# Patient Record
Sex: FEMALE | Race: WHITE | NOT HISPANIC OR LATINO | Employment: FULL TIME | URBAN - NONMETROPOLITAN AREA
[De-identification: names, ages, dates, MRNs, and addresses within clinical notes are randomized per-mention and may not be internally consistent; named-entity substitution may affect disease eponyms.]

---

## 2023-10-10 PROBLEM — J44.9 COPD (CHRONIC OBSTRUCTIVE PULMONARY DISEASE) (MULTI): Status: ACTIVE | Noted: 2023-10-10

## 2023-10-10 PROBLEM — I73.9 PAD (PERIPHERAL ARTERY DISEASE) (CMS-HCC): Status: ACTIVE | Noted: 2023-10-10

## 2023-10-10 PROBLEM — M54.16 CHRONIC LUMBAR RADICULOPATHY: Status: ACTIVE | Noted: 2023-10-10

## 2023-10-10 PROBLEM — M48.061 LUMBAR CANAL STENOSIS: Status: ACTIVE | Noted: 2023-10-10

## 2023-10-10 PROBLEM — M54.17 LUMBOSACRAL NEURITIS: Status: ACTIVE | Noted: 2023-10-10

## 2023-10-10 PROBLEM — R94.31 ABNORMAL ECG: Status: ACTIVE | Noted: 2023-10-10

## 2023-10-10 PROBLEM — F17.200 SMOKING: Status: ACTIVE | Noted: 2023-10-10

## 2023-10-10 RX ORDER — IPRATROPIUM BROMIDE AND ALBUTEROL SULFATE 2.5; .5 MG/3ML; MG/3ML
3 SOLUTION RESPIRATORY (INHALATION) 4 TIMES DAILY
COMMUNITY
Start: 2019-12-19

## 2023-10-10 RX ORDER — IBUPROFEN 200 MG
TABLET ORAL
COMMUNITY

## 2023-10-10 RX ORDER — IBUPROFEN 200 MG
1 TABLET ORAL EVERY 24 HOURS
COMMUNITY
Start: 2019-12-19

## 2023-10-10 RX ORDER — ALBUTEROL SULFATE 90 UG/1
2 AEROSOL, METERED RESPIRATORY (INHALATION) EVERY 6 HOURS
COMMUNITY

## 2023-10-10 RX ORDER — NAPROXEN SODIUM 220 MG/1
1 TABLET, FILM COATED ORAL DAILY
COMMUNITY
Start: 2019-12-19

## 2023-10-10 RX ORDER — OXYCODONE AND ACETAMINOPHEN 10; 325 MG/1; MG/1
1 TABLET ORAL 3 TIMES DAILY
COMMUNITY
Start: 2015-05-21

## 2023-10-10 RX ORDER — LISINOPRIL 2.5 MG/1
1 TABLET ORAL DAILY
COMMUNITY
Start: 2019-12-19

## 2023-10-10 RX ORDER — HYDROCHLOROTHIAZIDE 25 MG/1
TABLET ORAL
COMMUNITY
Start: 2015-04-17

## 2023-10-10 RX ORDER — DEXTROMETHORPHAN HBR. AND GUAIFENESIN 10; 100 MG/5ML; MG/5ML
5 SOLUTION ORAL EVERY 4 HOURS PRN
COMMUNITY
Start: 2019-12-19

## 2023-10-10 RX ORDER — GABAPENTIN 400 MG/1
1 CAPSULE ORAL 3 TIMES DAILY
COMMUNITY
Start: 2015-02-23

## 2023-10-10 RX ORDER — BENZONATATE 100 MG/1
100 CAPSULE ORAL 3 TIMES DAILY
COMMUNITY

## 2023-10-11 ENCOUNTER — EVALUATION (OUTPATIENT)
Dept: OCCUPATIONAL THERAPY | Facility: HOSPITAL | Age: 63
End: 2023-10-11
Payer: COMMERCIAL

## 2023-10-11 ENCOUNTER — EVALUATION (OUTPATIENT)
Dept: PHYSICAL THERAPY | Facility: HOSPITAL | Age: 63
End: 2023-10-11
Payer: COMMERCIAL

## 2023-10-11 DIAGNOSIS — S52.602D UNSPECIFIED FRACTURE OF LOWER END OF LEFT ULNA, SUBSEQUENT ENCOUNTER FOR CLOSED FRACTURE WITH ROUTINE HEALING: ICD-10-CM

## 2023-10-11 DIAGNOSIS — S52.502D UNSPECIFIED FRACTURE OF THE LOWER END OF LEFT RADIUS, SUBSEQUENT ENCOUNTER FOR CLOSED FRACTURE WITH ROUTINE HEALING: ICD-10-CM

## 2023-10-11 DIAGNOSIS — M25.632 WRIST STIFFNESS, LEFT: ICD-10-CM

## 2023-10-11 DIAGNOSIS — S52.502D CLOSED FRACTURE OF DISTAL END OF LEFT RADIUS WITH ROUTINE HEALING, UNSPECIFIED FRACTURE MORPHOLOGY, SUBSEQUENT ENCOUNTER: Primary | ICD-10-CM

## 2023-10-11 DIAGNOSIS — S82.122A CLOSED FRACTURE OF LATERAL PORTION OF LEFT TIBIAL PLATEAU: ICD-10-CM

## 2023-10-11 DIAGNOSIS — S82.122D CLOSED FRACTURE OF LATERAL PORTION OF LEFT TIBIAL PLATEAU WITH ROUTINE HEALING: Primary | ICD-10-CM

## 2023-10-11 DIAGNOSIS — S52.602D CLOSED FRACTURE OF DISTAL END OF LEFT ULNA WITH ROUTINE HEALING, UNSPECIFIED FRACTURE MORPHOLOGY, SUBSEQUENT ENCOUNTER: ICD-10-CM

## 2023-10-11 PROCEDURE — 97165 OT EVAL LOW COMPLEX 30 MIN: CPT | Mod: GO | Performed by: OCCUPATIONAL THERAPIST

## 2023-10-11 PROCEDURE — 97162 PT EVAL MOD COMPLEX 30 MIN: CPT | Mod: GP

## 2023-10-11 ASSESSMENT — ENCOUNTER SYMPTOMS
DEPRESSION: 0
DEPRESSION: 0
LOSS OF SENSATION IN FEET: 1
OCCASIONAL FEELINGS OF UNSTEADINESS: 1
LOSS OF SENSATION IN FEET: 0
OCCASIONAL FEELINGS OF UNSTEADINESS: 1

## 2023-10-11 ASSESSMENT — PAIN - FUNCTIONAL ASSESSMENT
PAIN_FUNCTIONAL_ASSESSMENT: 0-10
PAIN_FUNCTIONAL_ASSESSMENT: 0-10

## 2023-10-11 ASSESSMENT — PAIN SCALES - GENERAL
PAINLEVEL_OUTOF10: 2
PAINLEVEL_OUTOF10: 0 - NO PAIN

## 2023-10-11 ASSESSMENT — ACTIVITIES OF DAILY LIVING (ADL): BATHING_ASSISTANCE: MODIFIED INDEPENDENT (DEVICE)

## 2023-10-11 NOTE — LETTER
October 11, 2023     Patient: Chely Martinez   YOB: 1960   Date of Visit: 10/11/2023       To Whom It May Concern:    It is my medical opinion that Chely Martinez {Work release (duty restriction):78663}.    If you have any questions or concerns, please don't hesitate to call.         Sincerely,        Gume Elizondo, OT    CC: No Recipients

## 2023-10-11 NOTE — PROGRESS NOTES
Physical Therapy    Physical Therapy Evaluation    Patient Name: Chely Martinez  MRN: [id number]  Today's Date: 10/11/2023  Time Calculation  Start Time: 0900  Stop Time: 0940  Time Calculation (min): 40 min    Assessment  PT Assessment Results: Decreased strength, Decreased range of motion, Impaired balance, Decreased endurance  Rehab Prognosis: Good  Evaluation/Treatment Tolerance: Patient tolerated treatment well    The patient was evaluated by PT and presents with significant functional mobility deficits. Recommendation is for skilled PT to address deficits and work to return patient to [facility].    Plan  Treatment/Interventions: Education/ Instruction, Gait training, Therapeutic activities, Therapeutic exercises  PT Frequency: 2 times per week  Duration: 4  Number of Treatments Authorized: 20  Rehab Potential: Good  Plan of Care Agreement: Patient        Subjective   General:  General  Reason for Referral: Patient referred for ambulatory difficulty.  Referred By: Marcela Jean CNP  Past Medical History Relevant to Rehab: Patient fractured L tibial plateau. Acute then rehab stays completed, the patient was discharged home with HHPT. Patient no longer homebound and was referred for OPPT.  Precautions:  Precautions  STEADI Fall Risk Score (The score of 4 or more indicates an increased risk of falling): 7  LE Weight Bearing Status: Weight Bearing as Tolerated  Pain:  Pain Assessment: 0-10  Pain Score: 0 - No pain  Prior Function Per Pt/Caregiver Report:  Ambulatory Assistance: Independent  Vocational: Other (Comment) (fmla)    Objective   Range of Motion:  Range of Motion Comments: 0-125 L kinee flexion  Strength:  Strength Comments: 4+/5    Outcome Measures:  Timed Up and Go Test  How many seconds did it take to complete the 5 tasks?: 14.2 seconds    Other Measures  5x Sit to Stand: 17.5 sec  Lower Extremity Funtional Score (LEFS): 48     Goals:  Encounter Problems       Encounter Problems (Active)       PT Problem        The patient will ambulate 10 minutes continuously  to allow return to community ambulation tasks.  (Progressing)       Start:  10/11/23    Expected End:  11/11/23            The patient will improve performance in 5 time sit to stand test to < 15 to demonstrate increased LE strength to improve functional gait and transfers.  (Progressing)       Start:  10/11/23    Expected End:  11/11/23            The patient will decrease their time in the timed up and go to <12 sec to demonstrate improved balance and functional ambulatory ability.  (Progressing)       Start:  10/11/23    Expected End:  11/11/23            Increase L knee flexion to 135 degrees with 5/5 thigh MMT to restore normal mechanics. (Progressing)       Start:  10/11/23    Expected End:  11/11/23            The patient will demonstrate full understanding and competent performance of home program to maintain or further improve their condition.  (Progressing)       Start:  10/11/23    Expected End:  11/11/23

## 2023-10-11 NOTE — LETTER
October 11, 2023     Patient: Chely Martinez   YOB: 1960   Date of Visit: 10/11/2023       To Whom it May Concern:    Chely Martinez was seen in my clinic on 10/11/2023. She {Return to school/sport:69284}.    If you have any questions or concerns, please don't hesitate to call.         Sincerely,          Gume Elizondo, OT        CC: No Recipients

## 2023-10-11 NOTE — LETTER
October 11, 2023     Patient: Chely Martinez   YOB: 1960   Date of Visit: 10/11/2023       To Whom It May Concern:    It is my medical opinion that Chely Martinez {Work release (duty restriction):50245}.    If you have any questions or concerns, please don't hesitate to call.         Sincerely,        Andry Munson, PT    CC: No Recipients

## 2023-10-11 NOTE — PROGRESS NOTES
Occupational Therapy    Occupational Therapy Evaluation    Name: Chely Martinez  MRN: [id number]  : 1960  Date: 10/11/23  Time Calculation  Start Time: 1010  Stop Time: 1048  Time Calculation (min): 38 min    Assessment:  OT Assessment  OT Assessment Results: Decreased upper extremity range of motion, Decreased upper extremity strength, Decreased sensation, Decreased fine motor control  Strengths: Ability to acquire knowledge, Attitude of self, Capable of completing ADLs semi/independent, Coping skills, Rehab experience  Plan:  Outpatient Plan  Treatment Interventions: ADL retraining, UE strengthening/ROM, Endurance training, Patient/family training, Neuromuscular reeducation, Fine motor coordination activities  Frequency: 2x  Duration: 4 weeks  Number of Treatments Authorized: 20  Rehab Potential: Good  Plan of Care Agreement: Patient    Subjective   Current Problem:  1. Closed fracture of distal end of left radius with routine healing, unspecified fracture morphology, subsequent encounter  Follow Up In Occupational Therapy      2. Unspecified fracture of the lower end of left radius, subsequent encounter for closed fracture with routine healing  Referral to Occupational Therapy      3. Unspecified fracture of lower end of left ulna, subsequent encounter for closed fracture with routine healing  Referral to Occupational Therapy      4. Closed fracture of distal end of left ulna with routine healing, unspecified fracture morphology, subsequent encounter  Follow Up In Occupational Therapy      5. Wrist stiffness, left  Follow Up In Occupational Therapy        General:   OT Received On: 10/11/23  General  Reason for Referral: L UE ROM and strengthening dt fractures  Referred By: Marcela Jean  Past Medical History Relevant to Rehab: Pt fell down steps and fractured L wrist and L knee  Family/Caregiver Present: No  General Comment: Had home health when discharged from the hospital, now referred to  outpatient  Precautions:  Precautions  STEADI Fall Risk Score (The score of 4 or more indicates an increased risk of falling): 7  UE Weight Bearing Status: Weight Bearing as Tolerated  Braces Applied: history of bracing     Pain Assessment:  Pain Assessment  Pain Assessment: 0-10  Pain Score: 2  Pain Type: Chronic pain    Objective   Cognition:  Cognition  Overall Cognitive Status: Within Functional Limits  Cognition Test Scores:     Home Living:  Home Living  Type of Home: House  Lives With: Alone  Home Layout: One level  Home Access: Stairs to enter with rails  Entrance Stairs-Number of Steps: 3  Bathroom Shower/Tub: Tub/shower unit  Prior Function Per Pt/Caregiver Report:  Prior Function Per Pt/Caregiver Report  Level of Oliver: Independent with ADLs and functional transfers, Independent with homemaking with ambulation, Independent with homemaking with wheelchair  Receives Help From: Family  Vocational: Full time employment (works at [employer], currently [employer])  Hand Dominance: Right  IADL History:  Current License: Yes  Mode of Transportation: Car  ADL:  Eating Assistance: Modified independent (Device)  Bathing Assistance: Modified independent (Device)  UE Dressing Assistance: Modified independent (Device)  LE Dressing Assistance: Modified independent (Device)  Toileting Assistance with Device: Modified independent     Vision:Vision - Basic Assessment  Current Vision: Wears glasses only for reading  Sensation:  Light Touch: Partial deficits in the LUE  Sensation Comment: appears to be limited along median nerve distribution in L hand     Wrist Functional Rating Scale  Quick Dash: 27.27    Wrist AROM WFL unless documented below  R wrist flexion: (70°): 71  L wrist flexion: (70°): 50  R wrist extension: (70°): 58  L wrist extension: (70°): 38  R wrist radial deviation: (20°): 22  L wrist radial deviation: (20°): 27  R ulnar deviation: (30°): 35  L ulnar deviation: (30°): 24      Strength WFL unless  documented below  R  strength: 46  L  strength: 23    Left Hand AROM:  L Thumb MCP 0-60 (degrees): 40 Degrees  L Thumb IP 0-80 (degrees): 50 Degrees  L Thumb Radial ADduction/ABduction 0-55 (degrees): 40  L Thumb Palmar ADduction/ABduction 0-45 (degrees): 40  L Thumb Opposition to Index: oppose to middle phalanx of 5th digit  L Index  MCP 0-90 (degrees): 70 Degrees  L Index PIP 0-100 (degrees): 94 Degrees  L Index DIP 0-70 (degrees): 43 Degrees  L Long  MCP 0-90 (degrees): 86 Degrees  L Long PIP 0-100 (degrees): 100 Degrees  L Long DIP 0-70 (degrees): 46 Degrees  L Ring  MCP 0-90 (degrees): 90 Degrees  L Ring PIP 0-100 (degrees): 111 Degrees  L Ring DIP 0-70 (degrees): 40 Degrees  L Little  MCP 0-90 (degrees): 96 Degrees  L Little PIP 0-100 (degrees): 89 Degrees  L Little DIP 0-70 (degrees): 66 Degrees    Goals:  Active       OT Goals       Pt. will improve WRIST ROM BY 30 DEGREES in order to perform self-care, household, work and/or leisure tasks.          Start:  10/11/23    Expected End:  11/08/23            Pt. will demonstrate I with stating and demonstrating home exercise program in order to improve patient's ability to perform self-care, household, and/or leisure tasks.        Start:  10/11/23    Expected End:  11/08/23            Pt. will demo increased L hand fine motor ability as evidenced by performance on 9 hole peg test by 10 seconds       Start:  10/11/23    Expected End:  11/08/23            Pt. will increase hand strength by 15# to increase participation in self-care, household, and work/leisure tasks       Start:  10/11/23    Expected End:  11/08/23

## 2023-10-11 NOTE — LETTER
October 11, 2023    FAIZA Cain  [address]  [facility], [address] [address]    Patient: Chely Martinez   YOB: 1960   Date of Visit: 10/11/2023       Dear Faiza Cain  [address] [place] [address]    The attached plan of care is being sent to you because your patient’s medical reimbursement requires that you certify the plan of care. Your signature is required to allow uninterrupted insurance coverage.      You may indicate your approval by signing below and faxing this form back to us at Dept Fax: [phone].    Please call Dept: [phone] with any questions or concerns.    Thank you for this referral,        Andry Munson, PT  [facility]  [address] [facility] [address] [phone]    Payer: Payor: [employer] [place] / Plan: [employer] / Product Type: *No Product type* /                                                                         Date:     Dear Andry Munson, PT,     Re: Ms. Chely Martinez, MRN:[id number]    I certify that I have reviewed the attached plan of care and it is medically necessary for Ms. Chely Martinez (1960) who is under my care.          ______________________________________                    _________________  Provider name and credentials                                           Date and time                                                                                           Plan of Care 10/11/23   Effective from: 10/11/2023  Effective to: 11/11/2023    Plan ID: [id number]            Participants as of Finalize on 10/11/2023    Name Type Comments Contact Info    FAIZA Cain Referring Provider  [phone]    Andry Munson, PT Physical Therapist  [phone]       Last Plan Note     No notes of the expected types and statuses found.        Current Participants as of 10/11/2023    Name Type  Comments Contact Info    Marcela Jean, LAUREN-CNP Referring Provider  [phone]    Signature pending    Andry Munson, RENATE Physical Therapist  [phone]    Signature pending

## 2023-10-11 NOTE — LETTER
October 11, 2023    Andry Munson, PT  [address]  Rehab Services  [facility] [address]    Patient: Chely Martinez   YOB: 1960   Date of Visit: 10/11/2023       Dear Faiza Cain  [address] [place] [address]    The attached plan of care is being sent to you because your patient’s medical reimbursement requires that you certify the plan of care. Your signature is required to allow uninterrupted insurance coverage.      You may indicate your approval by signing below and faxing this form back to us at Dept Fax: [phone].    Please call Dept: [phone] with any questions or concerns.    Thank you for this referral,        Andry Munson PT  [facility]  [address]  [facility] [address]  [phone]    Payer: Payor: [employer] [place] / Plan: [employer] / Product Type: *No Product type* /                                                                         Date:     Dear Andry Munson PT,     Re: Ms. Chely Martinez, MRN:[id number]    I certify that I have reviewed the attached plan of care and it is medically necessary for Ms. Chely Martinez (1960) who is under my care.          ______________________________________                    _________________  Provider name and credentials                                           Date and time                                                                                           Plan of Care 10/11/23   Effective from: 10/11/2023  Effective to: 11/11/2023    Plan ID: [id number]            Participants as of Finalize on 10/11/2023    Name Type Comments Contact Info    FAIZA Cain Referring Provider  [phone]    Andry Munson PT Physical Therapist  [phone]       Last Plan Note     No notes of the expected types and statuses found.        Current Participants as of 10/11/2023    Name Type Comments Contact Info    Marcela Sanchez  LAUREN Jean-CNP Referring Provider  [phone]    Signature pending    Andry Munson PT Physical Therapist  [phone]    Signature pending

## 2023-10-11 NOTE — LETTER
October 11, 2023     Patient: Chely Martinez   YOB: 1960   Date of Visit: 10/11/2023       To Whom it May Concern:    Chely Martinez was seen in my clinic on 10/11/2023. She {Return to school/sport:04324}.    If you have any questions or concerns, please don't hesitate to call.         Sincerely,          Andry Munson, PT        CC: No Recipients

## 2023-10-16 ENCOUNTER — TREATMENT (OUTPATIENT)
Dept: PHYSICAL THERAPY | Facility: HOSPITAL | Age: 63
End: 2023-10-16
Payer: COMMERCIAL

## 2023-10-16 ENCOUNTER — TREATMENT (OUTPATIENT)
Dept: OCCUPATIONAL THERAPY | Facility: HOSPITAL | Age: 63
End: 2023-10-16
Payer: COMMERCIAL

## 2023-10-16 DIAGNOSIS — S82.122D CLOSED FRACTURE OF LATERAL PORTION OF LEFT TIBIAL PLATEAU WITH ROUTINE HEALING: ICD-10-CM

## 2023-10-16 DIAGNOSIS — M25.632 WRIST STIFFNESS, LEFT: ICD-10-CM

## 2023-10-16 DIAGNOSIS — S52.502D CLOSED FRACTURE OF DISTAL END OF LEFT RADIUS WITH ROUTINE HEALING, UNSPECIFIED FRACTURE MORPHOLOGY, SUBSEQUENT ENCOUNTER: ICD-10-CM

## 2023-10-16 DIAGNOSIS — S52.602D CLOSED FRACTURE OF DISTAL END OF LEFT ULNA WITH ROUTINE HEALING, UNSPECIFIED FRACTURE MORPHOLOGY, SUBSEQUENT ENCOUNTER: ICD-10-CM

## 2023-10-16 PROCEDURE — 97110 THERAPEUTIC EXERCISES: CPT | Mod: GO | Performed by: OCCUPATIONAL THERAPIST

## 2023-10-16 PROCEDURE — 97022 WHIRLPOOL THERAPY: CPT | Mod: GO | Performed by: OCCUPATIONAL THERAPIST

## 2023-10-16 PROCEDURE — 97110 THERAPEUTIC EXERCISES: CPT | Mod: GP

## 2023-10-16 ASSESSMENT — PAIN SCALES - GENERAL: PAINLEVEL_OUTOF10: 0 - NO PAIN

## 2023-10-16 ASSESSMENT — PAIN - FUNCTIONAL ASSESSMENT: PAIN_FUNCTIONAL_ASSESSMENT: 0-10

## 2023-10-16 NOTE — PROGRESS NOTES
Physical Therapy    Physical Therapy Treatment    Patient Name: Chely Martinez  MRN: [id number]  Today's Date: 10/16/2023  Time Calculation  Start Time: 1140  Stop Time: 1220  Time Calculation (min): 40 min      Assessment: Good tolerance of initial there ex program with just some minor fatigue post session.       Plan: Continue, build volume and intensity as tolerated.       Current Problem  1. Closed fracture of lateral portion of left tibial plateau with routine healing  Follow Up In Physical Therapy          Subjective   Doing well with no pain issues today.    General  Reason for Referral: Rehab s/p tibial plateau fracture  Pain  Pain Assessment: 0-10  Pain Score: 0 - No pain    Objective   Treatments:  Therapeutic Exercise  Therapeutic Exercise Performed: Yes  Therapeutic Exercise Activity 1: Pro2 x 8 @15W  Therapeutic Exercise Activity 2: Isokinetic knee flexion/extension 5x18b30 cm/sec  Therapeutic Exercise Activity 3: calf raise 3x10  Therapeutic Exercise Activity 4: abductor [id number]  Therapeutic Exercise Activity 5: EOB squat 3x10x BW  Therapeutic Exercise Activity 6: TM 5 min @ 1-1.5MPH      Goals:  Active       PT Problem       The patient will ambulate 10 minutes continuously  to allow return to community ambulation tasks.  (Progressing)       Start:  10/11/23    Expected End:  11/11/23            The patient will improve performance in 5 time sit to stand test to < 15 to demonstrate increased LE strength to improve functional gait and transfers.  (Progressing)       Start:  10/11/23    Expected End:  11/11/23            The patient will decrease their time in the timed up and go to <12 sec to demonstrate improved balance and functional ambulatory ability.  (Progressing)       Start:  10/11/23    Expected End:  11/11/23            Increase L knee flexion to 135 degrees with 5/5 thigh MMT to restore normal mechanics. (Progressing)       Start:  10/11/23    Expected End:  11/11/23            The patient  will demonstrate full understanding and competent performance of home program to maintain or further improve their condition.  (Progressing)       Start:  10/11/23    Expected End:  11/11/23

## 2023-10-16 NOTE — PROGRESS NOTES
Occupational Therapy    Occupational Therapy Treatment    Name: Chely Martinez  MRN: [id number]  : 1960  Date: 10/16/23  Time Calculation  Start Time: 1045  Stop Time: 1136  Time Calculation (min): 51 min  Visit:     Assessment: Upgraded HEP this date, no issues at this time     Plan: Continue with POC       Subjective   Pain Assessment: 2/10 wrist     Objective    Modalities:  Fluidotherapy L hand x 20 min at 115 degrees performing AROM     Updated current HEP from , reviewed with patient and performed each exercise with cues for tech. Printed exercises for patient at home.     OP EDUCATION:   Access Code: [email or web address]  URL: [email or web address]  Date: 10/16/2023  Prepared by: Gume Elizondo    Exercises  - Wrist Circumduction AROM  - 2 x daily - 7 x weekly - 2 sets - 10 reps  - Wrist Prayer Stretch at Table  - 2 x daily - 7 x weekly - 1 sets - 10 reps  - Seated Wrist Flexion PROM Stretch  - 2 x daily - 7 x weekly - 1 sets - 10 reps  - Wrist Flexion with Dumbbell  - 2 x daily - 7 x weekly - 1 sets - 10 reps 3# weight  - Wrist Extension with Dumbbell  - 2 x daily - 7 x weekly - 1 sets - 10 reps 3# weight  - Seated Wrist Radial Deviation with Dumbbell  - 2 x daily - 7 x weekly - 1 sets - 10 reps 3# weight  - Seated supination and pronation with hammer -  2 x daily - 7 x weekly - 1 sets - 10 reps with 3 sec hold  - Putty Squeezes  - 2 x daily - 7 x weekly - 1 sets - 10 reps red putty  - Finger Extension with Putty  - 2 x daily - 7 x weekly - 1 sets - 10 reps red putty  - Finger Adduction with Putty  - 2 x daily - 7 x weekly - 1 sets - 10 reps red putty  - Seated Finger Tip Pinch with Putty  - 2 x daily - 7 x weekly - 1 sets - 10 reps red putty    Goals:  Active       OT Goals       Pt. will improve WRIST ROM BY 30 DEGREES in order to perform self-care, household, work and/or leisure tasks.    (Progressing)       Start:  10/11/23    Expected End:  23            Pt. will  demonstrate I with stating and demonstrating home exercise program in order to improve patient's ability to perform self-care, household, and/or leisure tasks.  (Progressing)       Start:  10/11/23    Expected End:  11/08/23            Pt. will demo increased L hand fine motor ability as evidenced by performance on 9 hole peg test by 10 seconds (Progressing)       Start:  10/11/23    Expected End:  11/08/23            Pt. will increase hand strength by 15# to increase participation in self-care, household, and work/leisure tasks (Progressing)       Start:  10/11/23    Expected End:  11/08/23

## 2023-10-19 ENCOUNTER — TREATMENT (OUTPATIENT)
Dept: PHYSICAL THERAPY | Facility: HOSPITAL | Age: 63
End: 2023-10-19
Payer: COMMERCIAL

## 2023-10-19 ENCOUNTER — TREATMENT (OUTPATIENT)
Dept: OCCUPATIONAL THERAPY | Facility: HOSPITAL | Age: 63
End: 2023-10-19
Payer: COMMERCIAL

## 2023-10-19 DIAGNOSIS — M25.632 WRIST STIFFNESS, LEFT: ICD-10-CM

## 2023-10-19 DIAGNOSIS — S52.502D CLOSED FRACTURE OF DISTAL END OF LEFT RADIUS WITH ROUTINE HEALING, UNSPECIFIED FRACTURE MORPHOLOGY, SUBSEQUENT ENCOUNTER: ICD-10-CM

## 2023-10-19 DIAGNOSIS — S52.602D CLOSED FRACTURE OF DISTAL END OF LEFT ULNA WITH ROUTINE HEALING, UNSPECIFIED FRACTURE MORPHOLOGY, SUBSEQUENT ENCOUNTER: ICD-10-CM

## 2023-10-19 DIAGNOSIS — S82.122D CLOSED FRACTURE OF LATERAL PORTION OF LEFT TIBIAL PLATEAU WITH ROUTINE HEALING: ICD-10-CM

## 2023-10-19 PROCEDURE — 97110 THERAPEUTIC EXERCISES: CPT | Mod: GO

## 2023-10-19 PROCEDURE — 97010 HOT OR COLD PACKS THERAPY: CPT | Mod: GO

## 2023-10-19 PROCEDURE — 97110 THERAPEUTIC EXERCISES: CPT | Mod: GP,CQ

## 2023-10-19 ASSESSMENT — PAIN SCALES - GENERAL
PAINLEVEL_OUTOF10: 0 - NO PAIN
PAINLEVEL_OUTOF10: 0 - NO PAIN

## 2023-10-19 ASSESSMENT — PAIN - FUNCTIONAL ASSESSMENT
PAIN_FUNCTIONAL_ASSESSMENT: 0-10
PAIN_FUNCTIONAL_ASSESSMENT: 0-10

## 2023-10-19 NOTE — PROGRESS NOTES
Physical Therapy    Physical Therapy Treatment    Patient Name: Chely Martinez  MRN: [id number]  Today's Date: 10/19/2023  Time Calculation  Start Time: 1026  Stop Time: 1107  Time Calculation (min): 41 min      Assessment:   Patient tolerated all therapeutic interventions well. Making progress towards goals .     Plan:   Continue per POC and as patient tolerated     Current Problem  1. Closed fracture of lateral portion of left tibial plateau with routine healing  Follow Up In Physical Therapy          Subjective   General  General Comment: Patient states she does not really get much pain, just some achiness. Still struggling with ability to walk with  better quality    Pain  Pain Assessment: 0-10  Pain Score: 0 - No pain    Treatments:  Therapeutic Exercise  Therapeutic Exercise Performed: Yes- pro cycle x10 min, isokinetic knee flexion/extension 3x10 @90cm/sec, calf raises 3x10, hip abd machine 3x10 25#, EOB squat 3x10, santos alt hip ext 3x10, TM 5 min @1-1.5 MPH    Goals:  Active       PT Problem       The patient will ambulate 10 minutes continuously  to allow return to community ambulation tasks.  (Progressing)       Start:  10/11/23    Expected End:  11/11/23            The patient will improve performance in 5 time sit to stand test to < 15 to demonstrate increased LE strength to improve functional gait and transfers.  (Progressing)       Start:  10/11/23    Expected End:  11/11/23            The patient will decrease their time in the timed up and go to <12 sec to demonstrate improved balance and functional ambulatory ability.  (Progressing)       Start:  10/11/23    Expected End:  11/11/23            Increase L knee flexion to 135 degrees with 5/5 thigh MMT to restore normal mechanics. (Progressing)       Start:  10/11/23    Expected End:  11/11/23            The patient will demonstrate full understanding and competent performance of home program to maintain or further improve their condition.   (Progressing)       Start:  10/11/23    Expected End:  11/11/23

## 2023-10-19 NOTE — PROGRESS NOTES
Occupational Therapy    Occupational Therapy Treatment    Name: Chely Martinez  MRN: [id number]  : 1960  Date: 10/19/23  Time Calculation  Start Time: 0940  Stop Time: 1025  Time Calculation (min): 45 min    Assessment: increase ROM and strength; pt displayed some difficulty with session d/t decreased strength and coordination.      Plan: Continue with POC, monitor HEP       Subjective   General:  OT Last Visit  OT Received On: 10/19/23  General  General Comment: HEP going well. No pain over the past few days, stiffness.    Pain Assessment:  Pain Assessment  Pain Assessment: 0-10  Pain Score: 0 - No pain    Objective      Modalities:  Modalities Used: Yes  Modality 1: Untimed Hotpack (L wrist 10 minutes)     Therapy/Activity:   Therapeutic Exercise  Therapeutic Exercise Performed: Yes  Therapeutic Exercise Activity 1: PROM with overstretch and holds in wrist flexion/extension, radial/ulnar deviation, supination/pronation. Wrist flexion/extension, radial/ulnar deviation, supination/pronation with 2# weight. Gripper 5# 2 sets of 10 red clothspin tip pinch 2 sets of 10 green clothspin three jaw sherri 2 sets of 10.    OP EDUCATION:  Education  Individual(s) Educated: Patient  Education Provided: Anatomy & Physiology, Edema control  Patient/Caregiver Demonstrated Understanding: yes  Plan of Care Discussed and Agreed Upon: yes  Patient Response to Education: Patient/Caregiver Asked Appropriate Questions    Goals:  Active       OT Goals       Pt. will improve WRIST ROM BY 30 DEGREES in order to perform self-care, household, work and/or leisure tasks.    (Progressing)       Start:  10/11/23    Expected End:  23            Pt. will demonstrate I with stating and demonstrating home exercise program in order to improve patient's ability to perform self-care, household, and/or leisure tasks.  (Progressing)       Start:  10/11/23    Expected End:  23            Pt. will demo increased L hand fine motor ability  as evidenced by performance on 9 hole peg test by 10 seconds (Progressing)       Start:  10/11/23    Expected End:  11/08/23            Pt. will increase hand strength by 15# to increase participation in self-care, household, and work/leisure tasks (Progressing)       Start:  10/11/23    Expected End:  11/08/23

## 2023-10-25 ENCOUNTER — TREATMENT (OUTPATIENT)
Dept: OCCUPATIONAL THERAPY | Facility: HOSPITAL | Age: 63
End: 2023-10-25
Payer: COMMERCIAL

## 2023-10-25 ENCOUNTER — TREATMENT (OUTPATIENT)
Dept: PHYSICAL THERAPY | Facility: HOSPITAL | Age: 63
End: 2023-10-25
Payer: COMMERCIAL

## 2023-10-25 DIAGNOSIS — S82.122D CLOSED FRACTURE OF LATERAL PORTION OF LEFT TIBIAL PLATEAU WITH ROUTINE HEALING: ICD-10-CM

## 2023-10-25 DIAGNOSIS — S52.502D CLOSED FRACTURE OF DISTAL END OF LEFT RADIUS WITH ROUTINE HEALING, UNSPECIFIED FRACTURE MORPHOLOGY, SUBSEQUENT ENCOUNTER: ICD-10-CM

## 2023-10-25 DIAGNOSIS — M25.632 WRIST STIFFNESS, LEFT: ICD-10-CM

## 2023-10-25 DIAGNOSIS — S52.602D CLOSED FRACTURE OF DISTAL END OF LEFT ULNA WITH ROUTINE HEALING, UNSPECIFIED FRACTURE MORPHOLOGY, SUBSEQUENT ENCOUNTER: ICD-10-CM

## 2023-10-25 PROCEDURE — 97110 THERAPEUTIC EXERCISES: CPT | Mod: GP

## 2023-10-25 PROCEDURE — 97022 WHIRLPOOL THERAPY: CPT | Mod: GO

## 2023-10-25 PROCEDURE — 97110 THERAPEUTIC EXERCISES: CPT | Mod: GO

## 2023-10-25 ASSESSMENT — PAIN SCALES - GENERAL
PAINLEVEL_OUTOF10: 0 - NO PAIN
PAINLEVEL_OUTOF10: 0 - NO PAIN

## 2023-10-25 ASSESSMENT — PAIN - FUNCTIONAL ASSESSMENT
PAIN_FUNCTIONAL_ASSESSMENT: 0-10
PAIN_FUNCTIONAL_ASSESSMENT: 0-10

## 2023-10-25 NOTE — PROGRESS NOTES
Occupational Therapy    Occupational Therapy Treatment    Name: Chely Martinez  MRN: [id number]  : 1960  Date: 10/25/23  Time Calculation  Start Time: 1345  Stop Time: 1430  Time Calculation (min): 45 min    Assessment: increased wrist ROM/strength     Plan: Continue with POC       Subjective   General:  OT Last Visit  OT Received On: 10/25/23  General  General Comment: HEP going well. Pt reports holding down L arm during exercises with R to maintain correct posture.    Pain Assessment:  Pain Assessment  Pain Assessment: 0-10  Pain Score: 0 - No pain    Objective      Modalities:  Modalities Used: Yes  Modality 1: Untimed Fluidotherapy (L wrist 15 minutes with AROM throughout)    Therapy/Activity:   Therapeutic Exercise  Therapeutic Exercise Performed: Yes  Therapeutic Exercise Activity 1: PROM with overstretch and holds in wrist flexion/extension and supination/pronation. Wrist flexion/extension, radial/ulnar deviation, supination/pronation with 3# weight. Gripper 5# 2 sets of 10 red clothspin tip pinch; 2 sets of 10 green clothspin three jaw sherri.    Manual Therapy  Manual Therapy Performed: Yes  Manual Therapy Activity 1: cicumfrential/length measurements L leg and ankle taken for compression sock ordering this date. Pt has reported increased edema in L ankle since fracture.                          L ankle: 24.7 cm                     L mid-calf 29.5 cm                   Length (popliteal crease to bottom of foot): 40 cm    OP EDUCATION:  Education  Individual(s) Educated: Patient  Education Provided: Edema control, Anatomy & Physiology  Equipment: Tubigrip (B for L wrist)    Goals:  Active       OT Goals       Pt. will improve WRIST ROM BY 30 DEGREES in order to perform self-care, household, work and/or leisure tasks.    (Progressing)       Start:  10/11/23    Expected End:  23            Pt. will demonstrate I with stating and demonstrating home exercise program in order to improve patient's  ability to perform self-care, household, and/or leisure tasks.  (Progressing)       Start:  10/11/23    Expected End:  11/08/23            Pt. will demo increased L hand fine motor ability as evidenced by performance on 9 hole peg test by 10 seconds (Progressing)       Start:  10/11/23    Expected End:  11/08/23            Pt. will increase hand strength by 15# to increase participation in self-care, household, and work/leisure tasks (Progressing)       Start:  10/11/23    Expected End:  11/08/23

## 2023-10-25 NOTE — PROGRESS NOTES
Physical Therapy    Physical Therapy Treatment    Patient Name: Chely Martinez  MRN: [id number]  Today's Date: 10/25/2023  Time Calculation  Start Time: 1430  Stop Time: 1509  Time Calculation (min): 39 min      Assessment:  PT Assessment  Rehab Prognosis: Good    Plan:  OP PT Plan  Treatment/Interventions: Education/ Instruction, Therapeutic activities, Therapeutic exercises, Gait training  Rehab Potential: Good    Current Problem  1. Closed fracture of lateral portion of left tibial plateau with routine healing  Follow Up In Physical Therapy          Subjective   General  Patient doing well. No pain and more energy during home activities.    Reason for Referral: Rehab s/p tibial plateau fracture  Referred By: Marcela Jean CNP  Pain  Pain Assessment: 0-10  Pain Score: 0 - No pain    Objective   Treatments:  Therapeutic Exercise  Therapeutic Exercise Performed: Yes  Therapeutic Exercise Activity 2: Isokinetic knee flexion/extension [id number]/12/150/120/90 cm/sec  Therapeutic Exercise Activity 3: unilateral calf raise 3x10  Therapeutic Exercise Activity 4: abductor [id number]  Therapeutic Exercise Activity 5: EOB squat 3x10x 6#  Therapeutic Exercise Activity 6: TM 5 min @ 1-1.5MPH    Goals:    Active       PT Problem       The patient will ambulate 10 minutes continuously  to allow return to community ambulation tasks.  (Progressing)       Start:  10/11/23    Expected End:  11/11/23            The patient will improve performance in 5 time sit to stand test to < 15 to demonstrate increased LE strength to improve functional gait and transfers.  (Progressing)       Start:  10/11/23    Expected End:  11/11/23            The patient will decrease their time in the timed up and go to <12 sec to demonstrate improved balance and functional ambulatory ability.  (Progressing)       Start:  10/11/23    Expected End:  11/11/23            Increase L knee flexion to 135 degrees with 5/5 thigh MMT to restore normal mechanics.  (Progressing)       Start:  10/11/23    Expected End:  11/11/23            The patient will demonstrate full understanding and competent performance of home program to maintain or further improve their condition.  (Progressing)       Start:  10/11/23    Expected End:  11/11/23

## 2023-10-27 ENCOUNTER — TREATMENT (OUTPATIENT)
Dept: OCCUPATIONAL THERAPY | Facility: HOSPITAL | Age: 63
End: 2023-10-27
Payer: COMMERCIAL

## 2023-10-27 ENCOUNTER — TREATMENT (OUTPATIENT)
Dept: PHYSICAL THERAPY | Facility: HOSPITAL | Age: 63
End: 2023-10-27
Payer: COMMERCIAL

## 2023-10-27 DIAGNOSIS — M25.632 WRIST STIFFNESS, LEFT: ICD-10-CM

## 2023-10-27 DIAGNOSIS — S52.502D CLOSED FRACTURE OF DISTAL END OF LEFT RADIUS WITH ROUTINE HEALING, UNSPECIFIED FRACTURE MORPHOLOGY, SUBSEQUENT ENCOUNTER: ICD-10-CM

## 2023-10-27 DIAGNOSIS — S52.602D CLOSED FRACTURE OF DISTAL END OF LEFT ULNA WITH ROUTINE HEALING, UNSPECIFIED FRACTURE MORPHOLOGY, SUBSEQUENT ENCOUNTER: ICD-10-CM

## 2023-10-27 DIAGNOSIS — S82.122D CLOSED FRACTURE OF LATERAL PORTION OF LEFT TIBIAL PLATEAU WITH ROUTINE HEALING: ICD-10-CM

## 2023-10-27 PROCEDURE — 97110 THERAPEUTIC EXERCISES: CPT | Mod: GO

## 2023-10-27 PROCEDURE — 97022 WHIRLPOOL THERAPY: CPT | Mod: GO

## 2023-10-27 PROCEDURE — 97110 THERAPEUTIC EXERCISES: CPT | Mod: GP,CQ

## 2023-10-27 ASSESSMENT — PAIN - FUNCTIONAL ASSESSMENT
PAIN_FUNCTIONAL_ASSESSMENT: 0-10
PAIN_FUNCTIONAL_ASSESSMENT: 0-10

## 2023-10-27 ASSESSMENT — PAIN SCALES - GENERAL
PAINLEVEL_OUTOF10: 0 - NO PAIN
PAINLEVEL_OUTOF10: 0 - NO PAIN

## 2023-10-27 NOTE — PROGRESS NOTES
Physical Therapy    Physical Therapy Treatment    Patient Name: Chely Martinez  MRN: [id number]  Today's Date: 10/27/2023  Time Calculation  Start Time: 1315  Stop Time: 1355  Time Calculation (min): 40 min      Assessment:   Patient continues to make progress towards goals. Only reporting fatigue post session, no pain     Plan:  OP PT Plan  PT Plan:  (5/8 tx)  Continue per POC and as patient tolerated     Current Problem  1. Closed fracture of lateral portion of left tibial plateau with routine healing  Follow Up In Physical Therapy          Subjective   General  General Comment: Patient reports feel good today. States she does not feel like she limps as much when walking    Pain  Pain Assessment: 0-10  Pain Score: 0 - No pain      Treatments:  Therapeutic Exercise  Therapeutic Exercise Performed: Yes- pro cycle x10 min, Isokinetic knee flexion/extension [id number]/120/150/120/90 cm/sec, unilateral calf raise 3x10, hip abd machine 3x10 30#, santos hip ext 3x10, TM x5 min @1-1.5 MPH, EOB squat 3x10 6#     Goals:  Active       PT Problem       The patient will ambulate 10 minutes continuously  to allow return to community ambulation tasks.  (Progressing)       Start:  10/11/23    Expected End:  11/11/23            The patient will improve performance in 5 time sit to stand test to < 15 to demonstrate increased LE strength to improve functional gait and transfers.  (Progressing)       Start:  10/11/23    Expected End:  11/11/23            The patient will decrease their time in the timed up and go to <12 sec to demonstrate improved balance and functional ambulatory ability.  (Progressing)       Start:  10/11/23    Expected End:  11/11/23            Increase L knee flexion to 135 degrees with 5/5 thigh MMT to restore normal mechanics. (Progressing)       Start:  10/11/23    Expected End:  11/11/23            The patient will demonstrate full understanding and competent performance of home program to maintain or further  improve their condition.  (Progressing)       Start:  10/11/23    Expected End:  11/11/23

## 2023-10-27 NOTE — PROGRESS NOTES
Occupational Therapy    Occupational Therapy Treatment    Name: Chely Martinez  MRN: [id number]  : 1960  Date: 10/27/23  Time Calculation  Start Time: 1356  Stop Time: 1445  Time Calculation (min): 49 min    Assessment: increased wrist ROM and strength     Plan: Continue with POC, monitor HEP       Subjective   General:  OT Last Visit  OT Received On: 10/27/23  General  General Comment: Pt reports no change in wrist since last session. HEP going well and pt believes she is seeing improvements in her ROM.    Pain Assessment:  Pain Assessment  Pain Assessment: 0-10  Pain Score: 0 - No pain    Objective      Modalities:  Modalities Used: Yes  Modality 1: Untimed Fluidotherapy (L wrist 15 min)     Therapy/Activity:   Therapeutic Exercise  Therapeutic Exercise Performed: Yes  Therapeutic Exercise Activity 1: PROM with overstretch and holds in wrist flexion/extension. Wrist flexion/extension, radial/ulnar deviation, supination/pronation with 3# weight 2 sets of 10. Gripper 10# 3 sets of 10 red clothspin tip pinch; 2 sets of 10 green clothspin three jaw sherri. thera bar wrist rotations 2 sets of 10. Pt completed in hand manipulation including translation with pegs to increase coordination of L hand.    Goals:  Active       OT Goals       Pt. will improve WRIST ROM BY 30 DEGREES in order to perform self-care, household, work and/or leisure tasks.    (Progressing)       Start:  10/11/23    Expected End:  23            Pt. will demonstrate I with stating and demonstrating home exercise program in order to improve patient's ability to perform self-care, household, and/or leisure tasks.  (Progressing)       Start:  10/11/23    Expected End:  23            Pt. will demo increased L hand fine motor ability as evidenced by performance on 9 hole peg test by 10 seconds (Progressing)       Start:  10/11/23    Expected End:  23            Pt. will increase hand strength by 15# to increase participation in  self-care, household, and work/leisure tasks (Progressing)       Start:  10/11/23    Expected End:  11/08/23

## 2023-10-31 ENCOUNTER — TREATMENT (OUTPATIENT)
Dept: OCCUPATIONAL THERAPY | Facility: HOSPITAL | Age: 63
End: 2023-10-31
Payer: COMMERCIAL

## 2023-10-31 ENCOUNTER — TREATMENT (OUTPATIENT)
Dept: PHYSICAL THERAPY | Facility: HOSPITAL | Age: 63
End: 2023-10-31
Payer: COMMERCIAL

## 2023-10-31 DIAGNOSIS — M25.632 WRIST STIFFNESS, LEFT: ICD-10-CM

## 2023-10-31 DIAGNOSIS — S52.502D CLOSED FRACTURE OF DISTAL END OF LEFT RADIUS WITH ROUTINE HEALING, UNSPECIFIED FRACTURE MORPHOLOGY, SUBSEQUENT ENCOUNTER: ICD-10-CM

## 2023-10-31 DIAGNOSIS — S52.602D CLOSED FRACTURE OF DISTAL END OF LEFT ULNA WITH ROUTINE HEALING, UNSPECIFIED FRACTURE MORPHOLOGY, SUBSEQUENT ENCOUNTER: ICD-10-CM

## 2023-10-31 DIAGNOSIS — S82.122D CLOSED FRACTURE OF LATERAL PORTION OF LEFT TIBIAL PLATEAU WITH ROUTINE HEALING: ICD-10-CM

## 2023-10-31 PROCEDURE — 97022 WHIRLPOOL THERAPY: CPT | Mod: GO

## 2023-10-31 PROCEDURE — 97110 THERAPEUTIC EXERCISES: CPT | Mod: GO

## 2023-10-31 PROCEDURE — 97110 THERAPEUTIC EXERCISES: CPT | Mod: GP,CQ

## 2023-10-31 ASSESSMENT — PAIN SCALES - GENERAL
PAINLEVEL_OUTOF10: 0 - NO PAIN
PAINLEVEL_OUTOF10: 0 - NO PAIN

## 2023-10-31 ASSESSMENT — PAIN - FUNCTIONAL ASSESSMENT
PAIN_FUNCTIONAL_ASSESSMENT: 0-10
PAIN_FUNCTIONAL_ASSESSMENT: 0-10

## 2023-10-31 NOTE — PROGRESS NOTES
Physical Therapy    Physical Therapy Treatment    Patient Name: Chely Martinez  MRN: [id number]  Today's Date: 10/31/2023  Time Calculation  Start Time: 0800  Stop Time: 0845  Time Calculation (min): 45 min      Assessment:   Patient tolerated increased time on treadmill and increased weight with interventions throughout session. Making progress towards goals     Plan:  OP PT Plan  PT Plan:  (6/8 tx)  Continue per POC and as patient tolerated     Current Problem  1. Closed fracture of lateral portion of left tibial plateau with routine healing  Follow Up In Physical Therapy          Subjective   General  General Comment: Patient reports occasional twinge in the area she broke, but does not really have much pain anymore    Pain  Pain Assessment: 0-10  Pain Score: 0 - No pain    Treatments:  Therapeutic Exercise  Therapeutic Exercise Performed: Yes- pro cycle x10 min, Isokinetic knee flexion/extension 2i35w03/120/150/120/90 cm/sec, unilateral calf raise 3x10, hip abd machine 3x10 25#30#, santos hip ext 3x10, TM x8 min @1-1.5 MPH, EOB squat 3x10 9#      Goals:  Active       PT Problem       The patient will ambulate 10 minutes continuously  to allow return to community ambulation tasks.  (Progressing)       Start:  10/11/23    Expected End:  11/11/23            The patient will improve performance in 5 time sit to stand test to < 15 to demonstrate increased LE strength to improve functional gait and transfers.  (Progressing)       Start:  10/11/23    Expected End:  11/11/23            The patient will decrease their time in the timed up and go to <12 sec to demonstrate improved balance and functional ambulatory ability.  (Progressing)       Start:  10/11/23    Expected End:  11/11/23            Increase L knee flexion to 135 degrees with 5/5 thigh MMT to restore normal mechanics. (Progressing)       Start:  10/11/23    Expected End:  11/11/23            The patient will demonstrate full understanding and competent  performance of home program to maintain or further improve their condition.  (Progressing)       Start:  10/11/23    Expected End:  11/11/23

## 2023-10-31 NOTE — PROGRESS NOTES
Occupational Therapy    Occupational Therapy Treatment    Name: Chely Martinez  MRN: [id number]  : 1960  Date: 10/31/23  Time Calculation  Start Time: 845  Stop Time: 930  Time Calculation (min): 45 min    Assessment: increased wrist ROM, decreased pain     Plan: Continue with POC     Subjective   General:  OT Last Visit  OT Received On: 10/31/23  General  General Comment: HEP going well. 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 Pt reports she is seeing improvement in motion.  Pain Assessment:  Pain Assessment  Pain Assessment: 0-10  Pain Score: 0 - No pain    Objective    Modalities:  Modalities Used: Yes  Modality 1: Untimed Fluidotherapy (15 min L wrist)     Therapy/Activity:   Therapeutic Exercise  Therapeutic Exercise Performed: Yes  Therapeutic Exercise Activity 1: PROM with overstretch and holds in wrist flexion/extension. Wrist flexion/extension, radial/ulnar deviation with 3# weight 2 sets of 10. Supination/pronation 2 sets of 10 1.5#. Gripper 10# 3 sets of 10 red clothspin tip pinch; 2 sets of 10 green clothspin three jaw sherri. thera bar wrist rotations 2 sets of 10.     OP EDUCATION:  Education  Individual(s) Educated: Patient  Education Provided: Edema control, Anatomy & Physiology    Goals:  Active       OT Goals       Pt. will improve WRIST ROM BY 30 DEGREES in order to perform self-care, household, work and/or leisure tasks.    (Progressing)       Start:  10/11/23    Expected End:  23            Pt. will demonstrate I with stating and demonstrating home exercise program in order to improve patient's ability to perform self-care, household, and/or leisure tasks.  (Progressing)       Start:  10/11/23    Expected End:  23            Pt. will demo increased L hand fine motor ability as evidenced by performance on 9 hole peg test by 10 seconds (Progressing)       Start:  10/11/23    Expected End:  23            Pt. will increase hand strength by 15# to  increase participation in self-care, household, and work/leisure tasks (Progressing)       Start:  10/11/23    Expected End:  11/08/23

## 2023-11-02 ENCOUNTER — TREATMENT (OUTPATIENT)
Dept: OCCUPATIONAL THERAPY | Facility: HOSPITAL | Age: 63
End: 2023-11-02
Payer: COMMERCIAL

## 2023-11-02 ENCOUNTER — TREATMENT (OUTPATIENT)
Dept: PHYSICAL THERAPY | Facility: HOSPITAL | Age: 63
End: 2023-11-02
Payer: COMMERCIAL

## 2023-11-02 DIAGNOSIS — M25.632 WRIST STIFFNESS, LEFT: ICD-10-CM

## 2023-11-02 DIAGNOSIS — S82.122D CLOSED FRACTURE OF LATERAL PORTION OF LEFT TIBIAL PLATEAU WITH ROUTINE HEALING: ICD-10-CM

## 2023-11-02 DIAGNOSIS — S52.602D CLOSED FRACTURE OF DISTAL END OF LEFT ULNA WITH ROUTINE HEALING, UNSPECIFIED FRACTURE MORPHOLOGY, SUBSEQUENT ENCOUNTER: ICD-10-CM

## 2023-11-02 DIAGNOSIS — S52.502D CLOSED FRACTURE OF DISTAL END OF LEFT RADIUS WITH ROUTINE HEALING, UNSPECIFIED FRACTURE MORPHOLOGY, SUBSEQUENT ENCOUNTER: ICD-10-CM

## 2023-11-02 PROCEDURE — 97022 WHIRLPOOL THERAPY: CPT | Mod: GO

## 2023-11-02 PROCEDURE — 97110 THERAPEUTIC EXERCISES: CPT | Mod: GO

## 2023-11-02 PROCEDURE — 97110 THERAPEUTIC EXERCISES: CPT | Mod: GP

## 2023-11-02 ASSESSMENT — PAIN - FUNCTIONAL ASSESSMENT
PAIN_FUNCTIONAL_ASSESSMENT: 0-10
PAIN_FUNCTIONAL_ASSESSMENT: 0-10

## 2023-11-02 ASSESSMENT — PAIN SCALES - GENERAL
PAINLEVEL_OUTOF10: 0 - NO PAIN
PAINLEVEL_OUTOF10: 0 - NO PAIN

## 2023-11-02 NOTE — PROGRESS NOTES
Occupational Therapy    Occupational Therapy Treatment    Name: Chely Martinez  MRN: [id number]  : 1960  Date: 23  Time Calculation  Start Time: 08  Stop Time: 929  Time Calculation (min): 39 min    Assessment: increased ROM/strength     Plan:  Number of Treatments Authorized: 15/20  Continue with POC    Subjective   General:  OT Last Visit  OT Received On: 23  General  General Comment: HEP going well. Pt reporting no difference in wrist since last session.    Pain Assessment:  Pain Assessment  Pain Assessment: 0-10  Pain Score: 0 - No pain    Objective      Modalities:  Modalities Used: Yes  Modality 1: Untimed Fluidotherapy (L wrist 10 minutes)     Therapy/Activity:   Therapeutic Exercise  Therapeutic Exercise Performed: Yes  Therapeutic Exercise Activity 1: PROM with overstretch as well as place and holds in wrist flexion/extension. Wrist flexion/extension, radial/ulnar deviation with 3# weight 2x10. Supination/pronation 2x10 2#. Gripper 15# 3x10. Red clothspin tip pinch; 2x10 green clothspin three jaw sherri 2x10. Green thera bar wrist rotations 2x10.    Goals:  Active       OT Goals       Pt. will improve WRIST ROM BY 30 DEGREES in order to perform self-care, household, work and/or leisure tasks.    (Progressing)       Start:  10/11/23    Expected End:  23            Pt. will demonstrate I with stating and demonstrating home exercise program in order to improve patient's ability to perform self-care, household, and/or leisure tasks.  (Progressing)       Start:  10/11/23    Expected End:  23            Pt. will demo increased L hand fine motor ability as evidenced by performance on 9 hole peg test by 10 seconds (Progressing)       Start:  10/11/23    Expected End:  23            Pt. will increase hand strength by 15# to increase participation in self-care, household, and work/leisure tasks (Progressing)       Start:  10/11/23    Expected End:  23

## 2023-11-02 NOTE — PROGRESS NOTES
Physical Therapy    Physical Therapy Treatment    Patient Name: Chely Martinez  MRN: [id number]  Today's Date: 11/2/2023  Time Calculation  Start Time: 0930  Stop Time: 1013  Time Calculation (min): 43 min    Assessment:    PT Assessment  Assessment Comment: The patient is reporting a steady imporvement in her ability to perform her ambulatory ADL activities at home with a definite increase in strength. Still having some trouble with reciprocol stair descent. Program adjusted to improve in this aspect.    Plan:    OP PT Plan  PT Plan: Skilled PT    Current Problem  1. Closed fracture of lateral portion of left tibial plateau with routine healing  Follow Up In Physical Therapy          Subjective     No pain issues today.    Pain  Pain Assessment: 0-10  Pain Score: 0 - No pain    Objective     Treatments:    Therapeutic Exercise  Therapeutic Exercise Performed: Yes    Pro2 cycle x10 min, Isokinetic knee flexion/extension 5x10/12/15/12/10x90/120/150/120/90 cm/sec, unilateral calf raise 3x10, hip abd machine [id number]#, TB wall squat with upright tors [id number] substituted for EOB squat, Toe/heel tap stair descent drill initiated 4x5 reps. TM x8 min @1-1.5 MPH to conclude session.    OP EDUCATION:  Education  Education Provided: Body Mechanics  Patient/Caregiver Demonstrated Understanding: yes  Patient Response to Education: Patient/Caregiver Verbalized Understanding of Information    Goals:  Active       PT Problem       The patient will ambulate 10 minutes continuously  to allow return to community ambulation tasks.  (Progressing)       Start:  10/11/23    Expected End:  11/11/23            The patient will improve performance in 5 time sit to stand test to < 15 to demonstrate increased LE strength to improve functional gait and transfers.  (Progressing)       Start:  10/11/23    Expected End:  11/11/23            The patient will decrease their time in the timed up and go to <12 sec to demonstrate improved balance and  functional ambulatory ability.  (Progressing)       Start:  10/11/23    Expected End:  11/11/23            Increase L knee flexion to 135 degrees with 5/5 thigh MMT to restore normal mechanics. (Progressing)       Start:  10/11/23    Expected End:  11/11/23            The patient will demonstrate full understanding and competent performance of home program to maintain or further improve their condition.  (Progressing)       Start:  10/11/23    Expected End:  11/11/23

## 2023-11-07 ENCOUNTER — TREATMENT (OUTPATIENT)
Dept: PHYSICAL THERAPY | Facility: HOSPITAL | Age: 63
End: 2023-11-07
Payer: COMMERCIAL

## 2023-11-07 ENCOUNTER — TREATMENT (OUTPATIENT)
Dept: OCCUPATIONAL THERAPY | Facility: HOSPITAL | Age: 63
End: 2023-11-07
Payer: COMMERCIAL

## 2023-11-07 ENCOUNTER — DOCUMENTATION (OUTPATIENT)
Dept: PHYSICAL THERAPY | Facility: HOSPITAL | Age: 63
End: 2023-11-07

## 2023-11-07 DIAGNOSIS — S82.122D CLOSED FRACTURE OF LATERAL PORTION OF LEFT TIBIAL PLATEAU WITH ROUTINE HEALING: ICD-10-CM

## 2023-11-07 DIAGNOSIS — S52.502D CLOSED FRACTURE OF DISTAL END OF LEFT RADIUS WITH ROUTINE HEALING, UNSPECIFIED FRACTURE MORPHOLOGY, SUBSEQUENT ENCOUNTER: ICD-10-CM

## 2023-11-07 DIAGNOSIS — S52.602D CLOSED FRACTURE OF DISTAL END OF LEFT ULNA WITH ROUTINE HEALING, UNSPECIFIED FRACTURE MORPHOLOGY, SUBSEQUENT ENCOUNTER: ICD-10-CM

## 2023-11-07 DIAGNOSIS — M25.632 WRIST STIFFNESS, LEFT: ICD-10-CM

## 2023-11-07 PROCEDURE — 97110 THERAPEUTIC EXERCISES: CPT | Mod: GO | Performed by: OCCUPATIONAL THERAPIST

## 2023-11-07 PROCEDURE — 97110 THERAPEUTIC EXERCISES: CPT | Mod: GP,CQ

## 2023-11-07 PROCEDURE — 97010 HOT OR COLD PACKS THERAPY: CPT | Mod: GO | Performed by: OCCUPATIONAL THERAPIST

## 2023-11-07 ASSESSMENT — PAIN - FUNCTIONAL ASSESSMENT
PAIN_FUNCTIONAL_ASSESSMENT: 0-10
PAIN_FUNCTIONAL_ASSESSMENT: 0-10

## 2023-11-07 ASSESSMENT — PAIN SCALES - GENERAL
PAINLEVEL_OUTOF10: 0 - NO PAIN
PAINLEVEL_OUTOF10: 0 - NO PAIN

## 2023-11-07 NOTE — PROGRESS NOTES
Occupational Therapy    Occupational Therapy Treatment    Name: Chely Martinez  MRN: [id number]  : 1960  Date: 23  Time Calculation  Start Time: 901  Stop Time:   Time Calculation (min): 60 min    Assessment: Improved strength and extension of wrist in LUE. Added conservative CTS exercises and recommend night splint. Trialed splints but none fit, will assess her own splint at home next visit and possibly issue new splint next session     Plan:  OT Plan: Visit 8  Number of Treatments Authorized: 20  Continue with POC    Subjective   General:  OT Last Visit 23  OT Received On: 23  General Comment: Reports that she is discharged from PT now. Plans on returning to work on . Still having issues with numbness along median nerve distribution.    Pain Assessment:  Pain Assessment: 0-10  Pain Score: 0 - No pain    Objective       23   Wrist AROM   L wrist flexion: (70°) 50   L wrist extension: (70°) 48        23   Right Hand Strength -  (lbs)   Handle Setting 2 (lbs) 50 lbs   Left Hand Strength -  (lbs)   Handle Setting 2 (lbs) 30 lbs       Modalities:  Modalities Used: Yes  Modality 1: MHP to L hand x 10 min    Manual: Median nerve glides issued and demonstrated/performed    Therapy/Activity:   Therapeutic Exercise Performed: Yes  Therapeutic Exercise Activity 1: PROM wrist flexion, extension, deviation and pronation  Therapeutic Exercise Activity 2: Supination/pronation 2x10 2#.  Therapeutic Exercise Activity 3: Gripper 20# 3x10.  Therapeutic Exercise Activity 4: Green clothspin three jaw sherri 2x10.   Therapeutic Exercise Activity 5: Red therabar wrist flexion x 10, wrist extension x 10  Therapeutic Exercise Activity 6: Rolling weight up and down using wrist flexion and extension with eccentric control with 3# weight  Therapeutic Exercise Activity 7: Wrist exerciser x 4   Therapeutic Exercise Activity 8: Reviewed tendon glides for HEP     Goals:  Active        OT Goals       Pt. will improve WRIST ROM BY 30 DEGREES in order to perform self-care, household, work and/or leisure tasks.    (Progressing)       Start:  10/11/23    Expected End:  11/08/23            Pt. will demonstrate I with stating and demonstrating home exercise program in order to improve patient's ability to perform self-care, household, and/or leisure tasks.  (Progressing)       Start:  10/11/23    Expected End:  11/08/23            Pt. will demo increased L hand fine motor ability as evidenced by performance on 9 hole peg test by 10 seconds (Progressing)       Start:  10/11/23    Expected End:  11/08/23            Pt. will increase hand strength by 15# to increase participation in self-care, household, and work/leisure tasks (Progressing)       Start:  10/11/23    Expected End:  11/08/23

## 2023-11-07 NOTE — PROGRESS NOTES
Physical Therapy    Discharge Summary    Name: Chely Martinez  MRN: [id number]  : 1960  Date: 23    Discharge Summary: PT    Discharge Information: Date of discharge 2023, Date of last visit 2023, Number of attended visits 8, and Referred for fx L tibial plateau/difficulty walking.    Therapy Summary: The patient met all functional goals. Missed Knee flexion goal narrowly at 130 degrees and she will continue to work on this independently.      Rehab Discharge Reason: Achieved all and/or the most significant goals(s)

## 2023-11-07 NOTE — PROGRESS NOTES
Physical Therapy    Physical Therapy Treatment    Patient Name: Chely Martinez  MRN: [id number]  Today's Date: 11/7/2023  Time Calculation  Start Time: 0813  Stop Time: 0857  Time Calculation (min): 44 min      Assessment:   Patient met goals with the exception of L knee flexion ROM (lacking 5 degrees). States she has pretty much returned to all her normal activities. Discussed all results with supervising PT    Plan:    See Flowsheet PT PLAN     See discharge summary from supervising PT from 11/07     Current Problem  1. Closed fracture of lateral portion of left tibial plateau with routine healing  Follow Up In Physical Therapy          Subjective   General  General Comment: Patient has no new complaints at this time. Planning to go back to work on the 20th.    Pain  Pain Assessment: 0-10  Pain Score: 0 - No pain    Objective     Extremity/Trunk Assessment  AROM LLE (degrees)  L Knee Flexion 0-130: 130  Strength LLE  L Knee Flexion: 5/5  L Knee Extension: 5/5      Outcome Measures:  Timed Up and Go Test  How many seconds did it take to complete the 5 tasks?: 9 seconds    Other Measures  5x Sit to Stand: 7 sec  Lower Extremity Funtional Score (LEFS): 70     Treatments:  Therapeutic Exercise  Therapeutic Exercise Performed: Yes- pro cycle x10 min, hip abd machine 30# 3x10, isokinetic machine 5x10 @90/120/150/120/90, partial lunges 3x10, squats 6# 3x10, Amb on treadmill @1.5 MPH x5 min to cool down      Goals:  Active       PT Problem       The patient will ambulate 10 minutes continuously  to allow return to community ambulation tasks.  (Met)       Start:  10/11/23    Expected End:  11/11/23    Resolved:  11/07/23         The patient will improve performance in 5 time sit to stand test to < 15 to demonstrate increased LE strength to improve functional gait and transfers.  (Met)       Start:  10/11/23    Expected End:  11/11/23    Resolved:  11/07/23         The patient will decrease their time in the timed up and go to  <12 sec to demonstrate improved balance and functional ambulatory ability.  (Met)       Start:  10/11/23    Expected End:  11/11/23    Resolved:  11/07/23         Increase L knee flexion to 135 degrees with 5/5 thigh MMT to restore normal mechanics. (Progressing)       Start:  10/11/23    Expected End:  11/11/23         Goal Note       L knee flexion 130 - not met   Thigh MMT 5/5 - goal met               The patient will demonstrate full understanding and competent performance of home program to maintain or further improve their condition.  (Met)       Start:  10/11/23    Expected End:  11/11/23    Resolved:  11/07/23

## 2023-11-09 ENCOUNTER — APPOINTMENT (OUTPATIENT)
Dept: PHYSICAL THERAPY | Facility: HOSPITAL | Age: 63
End: 2023-11-09
Payer: COMMERCIAL

## 2023-11-09 ENCOUNTER — APPOINTMENT (OUTPATIENT)
Dept: OCCUPATIONAL THERAPY | Facility: HOSPITAL | Age: 63
End: 2023-11-09
Payer: COMMERCIAL

## 2023-11-09 ENCOUNTER — TREATMENT (OUTPATIENT)
Dept: OCCUPATIONAL THERAPY | Facility: HOSPITAL | Age: 63
End: 2023-11-09
Payer: COMMERCIAL

## 2023-11-09 DIAGNOSIS — S52.502D CLOSED FRACTURE OF DISTAL END OF LEFT RADIUS WITH ROUTINE HEALING, UNSPECIFIED FRACTURE MORPHOLOGY, SUBSEQUENT ENCOUNTER: ICD-10-CM

## 2023-11-09 DIAGNOSIS — S52.602D CLOSED FRACTURE OF DISTAL END OF LEFT ULNA WITH ROUTINE HEALING, UNSPECIFIED FRACTURE MORPHOLOGY, SUBSEQUENT ENCOUNTER: ICD-10-CM

## 2023-11-09 DIAGNOSIS — M25.632 WRIST STIFFNESS, LEFT: Primary | ICD-10-CM

## 2023-11-09 PROCEDURE — 97010 HOT OR COLD PACKS THERAPY: CPT | Mod: GO

## 2023-11-09 PROCEDURE — 97110 THERAPEUTIC EXERCISES: CPT | Mod: GO

## 2023-11-09 ASSESSMENT — PAIN SCALES - GENERAL: PAINLEVEL_OUTOF10: 0 - NO PAIN

## 2023-11-09 ASSESSMENT — PAIN - FUNCTIONAL ASSESSMENT: PAIN_FUNCTIONAL_ASSESSMENT: 0-10

## 2023-11-09 NOTE — LETTER
November 9, 2023    FAIZA Cain  [address]   [facility], [address] [address]    Patient: Chely Martinez   YOB: 1960   Date of Visit: 11/9/2023       Dear No referring provider defined for this encounter.    The attached plan of care is being sent to you because your patient’s medical reimbursement requires that you certify the plan of care. Your signature is required to allow uninterrupted insurance coverage.      You may indicate your approval by signing below and faxing this form back to us at Dept Fax: [phone].    Please call Dept: [phone] with any questions or concerns.    Thank you for this referral,        Carol Allen OT  [facility]  [address] [facility] [address]    Payer: Payor: [employer] [place] / Plan: [employer] / Product Type: *No Product type* /                                                                         Date:     Dear Carol Allen OT,     Re: Ms. Cheyl Martinez, MRN:[id number]    I certify that I have reviewed the attached plan of care and it is medically necessary for Ms. Chely Martinez (1960) who is under my care.          ______________________________________                    _________________  Provider name and credentials                                           Date and time                                                                                           Plan of Care 11/11/23   Effective from: 11/11/2023  Effective to: 11/17/2023    Plan ID: [id number]            Participants as of Finalize on 11/9/2023    Name Type Comments Contact Info    FAIZA Cain Consulting Physician  [phone]    Carol Allen OT Occupational Therapist  [phone]       Last Plan Note     Author: Carol Allen OT Status: Sign when Signing Visit Last edited: 11/9/2023  9:30 AM       Occupational  Therapy    Occupational Therapy Treatment/Re-check    Name: Chely Martinez  MRN: [id number]  : 1960  Date: 23  Time Calculation  Start Time: 935  Stop Time:   Time Calculation (min): 40 min    Assessment:   OT Assessment Results: Decreased upper extremity range of motion, Decreased upper extremity strength  Strengths: Ability to acquire knowledge, Attitude of self    Plan:  OT Plan: Pt showing increased coordination and improvements in strength. Pt would like to continue therapy to continue to gain strength for one more week before returning to work on the .  Frequency: 2x  Duration:  (1 week)  Onset Date: 23  Certification Period Start Date: 23  Certification Period End Date: 23  Number of Treatments Authorized: 20  Rehab Potential: Excellent  Plan of Care Agreement: Patient    Subjective   General:  OT Last Visit  OT Received On: 23  General  General Comment: Pt reports brace is comfortable and hand and fingers do not go numb when she is wearing it.     Pain Assessment:  Pain Assessment  Pain Assessment: 0-10  Pain Score: 0 - No pain    Objective      Therapy/Activity:   Therapeutic Exercise  Therapeutic Exercise Performed: Yes  Therapeutic Exercise Activity 1: Wrist flexion/extension, radial/ulnar deviation with 3# weight 2x10. Supination/pronation 2x10 2#. Gripper 25# 3x10. Green clothspin tip pinch 2x10; Blue clothspin three jaw sherri 2x10. Green thera bar wrist rotations 2x10. Forearm rotations in standing 2x10 2#.    Manual Therapy  Manual Therapy Performed: Yes  Manual Therapy Activity 1: scar massage completed with vibartion this date to decrease adhesion and increase wrist motion. Pt educated on how to complete scar massage at home.    Outcome Measures:  OT Adult Other Outcome Measures  9 Hole Peg Test: R:19.45 sec; L: 22.5    OP EDUCATION:  Education  Individual(s) Educated: Patient  Education Provided: Orthotics wearing schedule and precautions, POC discussed  and agreed upon  Home Program: Wound/scar care, Orthotic wearing schedule, care and precautions  Patient Response to Education: Patient/Caregiver Verbalized Understanding of Information    Goals:  Active       OT Goals       Pt. will improve WRIST ROM BY 30 DEGREES in order to perform self-care, household, work and/or leisure tasks.    (Progressing)       Start:  10/11/23    Expected End:  11/17/23            Pt. will demonstrate I with stating and demonstrating home exercise program in order to improve patient's ability to perform self-care, household, and/or leisure tasks.  (Progressing)       Start:  10/11/23    Expected End:  11/17/23            Pt. will demo increased L hand fine motor ability as evidenced by performance on 9 hole peg test by 10 seconds (Met)       Start:  10/11/23    Expected End:  11/08/23    Resolved:  11/09/23         Pt. will increase hand strength by 15# to increase participation in self-care, household, and work/leisure tasks (Progressing)       Start:  10/11/23    Expected End:  11/17/23                               Current Participants as of 11/9/2023    Name Type Comments Contact Info    Marcela Jean, LAUREN-CNP Consulting Physician  [phone]    Signature pending    Carol Palomino OT Occupational Therapist  [phone]    Signature pending

## 2023-11-09 NOTE — PROGRESS NOTES
Occupational Therapy    Occupational Therapy Treatment/Re-check    Name: Chely Martinez  MRN: [id number]  : 1960  Date: 23  Time Calculation  Start Time: 935  Stop Time:   Time Calculation (min): 40 min    Assessment:   OT Assessment Results: Decreased upper extremity range of motion, Decreased upper extremity strength  Strengths: Ability to acquire knowledge, Attitude of self    Plan:  OT Plan: Pt showing increased coordination and improvements in strength. Pt would like to continue therapy to continue to gain strength for one more week before returning to work on the .  Frequency: 2x  Duration:  (1 week)  Onset Date: 23  Certification Period Start Date: 23  Certification Period End Date: 23  Number of Treatments Authorized: 20  Rehab Potential: Excellent  Plan of Care Agreement: Patient    Subjective   General:  OT Last Visit  OT Received On: 23  General  General Comment: Pt reports brace is comfortable and hand and fingers do not go numb when she is wearing it.     Pain Assessment:  Pain Assessment  Pain Assessment: 0-10  Pain Score: 0 - No pain    Objective      Modalities  Untimed Hotpack   L wrist 10 minutes to decrease stiffness     Therapy/Activity:   Therapeutic Exercise  Therapeutic Exercise Performed: Yes  Therapeutic Exercise Activity 1: Wrist flexion/extension, radial/ulnar deviation with 3# weight 2x10. Supination/pronation 2x10 2#. Gripper 25# 3x10. Green clothspin tip pinch 2x10; Blue clothspin three jaw sherri 2x10. Green thera bar wrist rotations 2x10. Forearm rotations in standing 2x10 2#.    Manual Therapy  Manual Therapy Performed: Yes  Manual Therapy Activity 1: scar massage completed with vibartion this date to decrease adhesion and increase wrist motion. Pt educated on how to complete scar massage at home.    Outcome Measures:  OT Adult Other Outcome Measures  9 Hole Peg Test: R:19.45 sec; L: 22.5    OP EDUCATION:  Education  Individual(s) Educated:  Patient  Education Provided: Orthotics wearing schedule and precautions, POC discussed and agreed upon  Home Program: Wound/scar care, Orthotic wearing schedule, care and precautions  Patient Response to Education: Patient/Caregiver Verbalized Understanding of Information    Goals:  Active       OT Goals       Pt. will improve WRIST ROM BY 30 DEGREES in order to perform self-care, household, work and/or leisure tasks.    (Progressing)       Start:  10/11/23    Expected End:  11/17/23            Pt. will demonstrate I with stating and demonstrating home exercise program in order to improve patient's ability to perform self-care, household, and/or leisure tasks.  (Progressing)       Start:  10/11/23    Expected End:  11/17/23            Pt. will demo increased L hand fine motor ability as evidenced by performance on 9 hole peg test by 10 seconds (Met)       Start:  10/11/23    Expected End:  11/08/23    Resolved:  11/09/23         Pt. will increase hand strength by 15# to increase participation in self-care, household, and work/leisure tasks (Progressing)       Start:  10/11/23    Expected End:  11/17/23

## 2023-11-14 ENCOUNTER — TREATMENT (OUTPATIENT)
Dept: OCCUPATIONAL THERAPY | Facility: HOSPITAL | Age: 63
End: 2023-11-14
Payer: COMMERCIAL

## 2023-11-14 DIAGNOSIS — M25.632 WRIST STIFFNESS, LEFT: ICD-10-CM

## 2023-11-14 PROCEDURE — 97022 WHIRLPOOL THERAPY: CPT | Mod: GO

## 2023-11-14 PROCEDURE — 97110 THERAPEUTIC EXERCISES: CPT | Mod: GO

## 2023-11-14 ASSESSMENT — PAIN SCALES - GENERAL: PAINLEVEL_OUTOF10: 0 - NO PAIN

## 2023-11-14 ASSESSMENT — PAIN - FUNCTIONAL ASSESSMENT: PAIN_FUNCTIONAL_ASSESSMENT: 0-10

## 2023-11-14 NOTE — PROGRESS NOTES
Occupational Therapy    Occupational Therapy Treatment    Name: Chely Martinez  MRN: [id number]  : 1960  Date: 23  Time Calculation  Start Time: 932  Stop Time: 1010  Time Calculation (min): 38 min    Assessment:   Increased joint ROM and strength; no pain noted this date with wrist/hand/finger exercises.     Plan:  OT Plan: 1/2 visits    Subjective   General:  OT Last Visit  OT Received On: 23  General  General Comment: Pt reports she is only having pain when completing something difficult. Pt returning to work next Monday.     Pain Assessment:  Pain Assessment  Pain Assessment: 0-10  Pain Score: 0 - No pain    Objective       Modalities:  Modalities Used: Yes  Modality 1: Untimed Fluidotherapy (15 minutes L wrist performing AROM)     Therapy/Activity: Therapeutic Exercise  Therapeutic Exercise Performed: Yes  Therapeutic Exercise Activity 1: Wrist flexion/extension, radial/ulnar deviation with 3# weight 2x10. Supination/pronation 2x10 2#. Gripper 25# 3x10. Green clothspin tip pinch 2x10; Blue clothspin three jaw sherri 2x10. Green thera bar wrist rotations 2x10. Forearm rotations in standing 3x10 2#.    OP EDUCATION:  Education  Individual(s) Educated: Patient  Education Provided:  (HEP)  Home Program:  ( strengthening)  Equipment:  (green eggersizer)  Patient Response to Education: Patient/Caregiver Verbalized Understanding of Information, Patient/Caregiver Performed Return Demonstration of Exercises/Activities    Goals:  Active       OT Goals       Pt. will improve WRIST ROM BY 30 DEGREES in order to perform self-care, household, work and/or leisure tasks.    (Progressing)       Start:  10/11/23    Expected End:  23            Pt. will demonstrate I with stating and demonstrating home exercise program in order to improve patient's ability to perform self-care, household, and/or leisure tasks.  (Progressing)       Start:  10/11/23    Expected End:  23            Pt. will demo  increased L hand fine motor ability as evidenced by performance on 9 hole peg test by 10 seconds (Met)       Start:  10/11/23    Expected End:  11/08/23    Resolved:  11/09/23         Pt. will increase hand strength by 15# to increase participation in self-care, household, and work/leisure tasks (Progressing)       Start:  10/11/23    Expected End:  11/17/23

## 2023-11-16 ENCOUNTER — TREATMENT (OUTPATIENT)
Dept: OCCUPATIONAL THERAPY | Facility: HOSPITAL | Age: 63
End: 2023-11-16
Payer: COMMERCIAL

## 2023-11-16 DIAGNOSIS — M25.632 WRIST STIFFNESS, LEFT: ICD-10-CM

## 2023-11-16 PROCEDURE — 97022 WHIRLPOOL THERAPY: CPT | Mod: GO

## 2023-11-16 PROCEDURE — 97110 THERAPEUTIC EXERCISES: CPT | Mod: GO

## 2023-11-16 ASSESSMENT — PAIN SCALES - GENERAL: PAINLEVEL_OUTOF10: 0 - NO PAIN

## 2023-11-16 ASSESSMENT — PAIN - FUNCTIONAL ASSESSMENT: PAIN_FUNCTIONAL_ASSESSMENT: 0-10

## 2023-11-16 NOTE — PROGRESS NOTES
Occupational Therapy    Occupational Therapy Treatment    Name: Chely Martinez  MRN: [id number]  : 1960  Date: 23  Time Calculation  Start Time: 930  Stop Time:   Time Calculation (min): 40 min    Assessment:  Increased wrist ROM and strength     Plan:  OT Plan: Pt to be discharged from therapy this date. All goals adequate for discharge/met. Pt returning to work next week and reports her wrist is adequate to complete work tasks.    Subjective   General:  OT Last Visit  OT Received On: 23  General  General Comment: Pt reports no change in wrist since last session. Pt is returning to work next week and being discharged this date from OT.     Pain Assessment:  Pain Assessment  Pain Assessment: 0-10  Pain Score: 0 - No pain    Objective      Modalities:  Modalities Used: Yes  Modality 1: Untimed Fluidotherapy (15 min L wrist completing AROM)     Therapy/Activity:   Therapeutic Exercise  Therapeutic Exercise Performed: Yes  Therapeutic Exercise Activity 1: Wrist flexion/extension, radial/ulnar deviation with 3# weight 1x10. Supination/pronation 2x10 2#. Gripper 25# 2x10. Green clothspin tip pinch 2x10; Blue clothspin three jaw sherri 2x10. ROM and strength measurements taken this date.     Wrist Functional Rating Scale  Quick Dash: 9.09     Wrist AROM WFL unless documented below  L wrist flexion: (70°): 50  L wrist extension: (70°): 50  L wrist radial deviation: (20°): 20  L ulnar deviation: (30°): 20      Strength WFL unless documented below  L  strength: 28    OP EDUCATION:  Education  Individual(s) Educated: Patient  Education Provided:  (continuation of HEP following discharge from therapy.)    Goals:  Active       OT Goals       Pt. will improve WRIST ROM BY 30 DEGREES in order to perform self-care, household, work and/or leisure tasks.    (Adequate for Discharge)       Start:  10/11/23    Expected End:  23            Pt. will demonstrate I with stating and demonstrating home  exercise program in order to improve patient's ability to perform self-care, household, and/or leisure tasks.  (Met)       Start:  10/11/23    Expected End:  11/17/23    Resolved:  11/16/23         Pt. will demo increased L hand fine motor ability as evidenced by performance on 9 hole peg test by 10 seconds (Met)       Start:  10/11/23    Expected End:  11/08/23    Resolved:  11/09/23         Pt. will increase hand strength by 15# to increase participation in self-care, household, and work/leisure tasks (Adequate for Discharge)       Start:  10/11/23    Expected End:  11/17/23